# Patient Record
Sex: FEMALE | Race: WHITE | NOT HISPANIC OR LATINO | URBAN - METROPOLITAN AREA
[De-identification: names, ages, dates, MRNs, and addresses within clinical notes are randomized per-mention and may not be internally consistent; named-entity substitution may affect disease eponyms.]

---

## 2020-03-11 NOTE — PATIENT DISCUSSION
CATARACTS, OS: VISUALLY SIGNIFICANT. OPTION OF SURGERY VERSUS FOLLOWING VERSUS UPDATING GLASSES DISCUSSED. RBA'S DISCUSSED, PATIENT UNDERSTANDS AND DESIRES SURGERY TO INCREASE VISION FOR READING, DOING CLOSE WORK AND TO REDUCE GLARE IN ORDER TO DRIVE SAFELY AT NIGHT. SCHEDULE CATARACT SURGERY/PRE-OP OS (WILL SHOOT FOR -1.25 -1.75).

## 2020-08-13 NOTE — PATIENT DISCUSSION
CATARACTS, OS: VISUALLY SIGNIFICANT. OPTION OF SURGERY VERSUS FOLLOWING VERSUS UPDATING GLASSES DISCUSSED. RBA'S DISCUSSED, PATIENT UNDERSTANDS AND DESIRES SURGERY TO INCREASE VISION FOR READING, DRIVING, WATCHING TV, SEEING STREET SIGNS, AND TO REDUCE GLARE IN ORDER TO DRIVE SAFELY AT NIGHT. SCHEDULE CATARACT SURGERY/PRE-OP OS.

## 2022-07-13 ENCOUNTER — CONSULTATION/EVALUATION (OUTPATIENT)
Dept: URBAN - METROPOLITAN AREA CLINIC 14 | Facility: CLINIC | Age: 42
End: 2022-07-13

## 2022-07-13 DIAGNOSIS — H52.7: ICD-10-CM

## 2022-07-13 PROCEDURE — 99499LK REFRACTIVE CONSULT/LASIK

## 2022-07-13 RX ORDER — PREDNISOLONE ACETATE 10 MG/ML: 1 SUSPENSION/ DROPS OPHTHALMIC

## 2022-07-13 RX ORDER — GATIFLOXACIN 5 MG/ML: 1 SOLUTION/ DROPS OPHTHALMIC

## 2022-07-13 ASSESSMENT — KERATOMETRY
OS_K1POWER_DIOPTERS: 43.75
OS_K2POWER_DIOPTERS: 44
OD_AXISANGLE_DEGREES: 147
OD_K2POWER_DIOPTERS: 44.25
OS_AXISANGLE2_DEGREES: 117
OD_AXISANGLE2_DEGREES: 57
OS_AXISANGLE_DEGREES: 27
OD_K1POWER_DIOPTERS: 43.75

## 2022-07-13 ASSESSMENT — PACHYMETRY
OS_CT_UM: 508
OD_CT_UM: 513

## 2022-12-29 NOTE — PATIENT DISCUSSION
NO HOLES. NO TEARS. RETINAL DETACHMENT WARNINGS  DISCUSSED. FLOATERS AND FLASHES BROCHURE GIVEN. OFFERED REFERRAL TO DR Maikel Domínguez FOR EVALUATION IF SYMPTOMS BECOME MORE BOTHERSOME. RETURN FOR FOLLOW-UP AS SCHEDULED.